# Patient Record
Sex: FEMALE | Race: OTHER | HISPANIC OR LATINO
[De-identification: names, ages, dates, MRNs, and addresses within clinical notes are randomized per-mention and may not be internally consistent; named-entity substitution may affect disease eponyms.]

---

## 2023-02-12 PROBLEM — Z00.00 ENCOUNTER FOR PREVENTIVE HEALTH EXAMINATION: Status: ACTIVE | Noted: 2023-02-12

## 2023-02-13 ENCOUNTER — APPOINTMENT (OUTPATIENT)
Dept: PSYCHIATRY | Facility: CLINIC | Age: 73
End: 2023-02-13
Payer: MEDICARE

## 2023-02-13 ENCOUNTER — TRANSCRIPTION ENCOUNTER (OUTPATIENT)
Age: 73
End: 2023-02-13

## 2023-02-13 PROCEDURE — 90792 PSYCH DIAG EVAL W/MED SRVCS: CPT | Mod: 95

## 2023-02-16 ENCOUNTER — APPOINTMENT (OUTPATIENT)
Dept: PSYCHIATRY | Facility: CLINIC | Age: 73
End: 2023-02-16
Payer: MEDICARE

## 2023-02-16 DIAGNOSIS — F41.1 GENERALIZED ANXIETY DISORDER: ICD-10-CM

## 2023-02-16 DIAGNOSIS — F60.9 PERSONALITY DISORDER, UNSPECIFIED: ICD-10-CM

## 2023-02-16 DIAGNOSIS — F32.A DEPRESSION, UNSPECIFIED: ICD-10-CM

## 2023-02-16 PROCEDURE — 99215 OFFICE O/P EST HI 40 MIN: CPT | Mod: 95

## 2023-02-16 NOTE — HISTORY OF PRESENT ILLNESS
[Suicidal Behavior/Ideation] : suicidal behavior/ideation [Not Applicable] : Not applicable [FreeTextEntry1] : Pt  was recommended for an evaluation by her daughter who works in healthcare (pediatric ICU ) and has observed changes in pt since her snf.\par \par  Pt reports having anxiety on and off her entire life. 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 \par \par Since snf she feels bored and that she has no meaning in her life. She also describes feeling greatly effected by socio-political issues that have created tension in her life and caused her to lose friends recently. Pt reports currently feeling afraid to leave the house due to concern that she could get sick. \par \par Pt described having a general fear and feeling her skin is crawling when she thinks about leaving the house. Pt was unable to provide specific examples of her anxiety.  Pt rates her anxiety 5/10 and despite her fear, she is able to leave the house and continues driving. \par \par Pt  reports that her anxiety and loneliness is at its worst when she is alone. When she goes out with others, such as her daughter, she reports feeling great. Pt did not endorse any changes in appetite or sleep disturbance. Pt endorsed past SI, related to a suicide attempt in 1994 and denied any SI since then. Pt denied any present or lifetime HI. \par  [FreeTextEntry2] : PAST OUTPATIENT TREATMENT (PSYCHOTHERAPY, PSYCHOPHARMACOLOGY, PSYCHOLOGICAL TESTING):\par Pt received therapy in 1994. Pt unsure if she worked with a therapist or just a psychiatrist. During this time she was prescribed various medications but does not remember what any of them were. \par \par PAST HOSPITALIZATIONS (DATES, REASON FOR ADMISSION, LENGTH OF STAY, TREATMENT, LAST PSYCHIATRIC TREATMENT):\par In 1994 pt was hospitalized for about a month following an attempt to end her life by overdosing on prescribed medications that she believes was xanax. Pt reports that during her hospitalization the medication she was given made her feel worse so she secretly stopped taking them and told the doctors what they wanted to hear to get discharged. \par \par \par  [FreeTextEntry3] : PAST PSYCHIATRIC MEDICATIONS (NAMES, DOSES, DATES TAKEN, EFFECTIVENESS):\par Pt reports being prescribed  medication around 1994 for anxiety and depression.Pt explained that  the more medication  she was put on, the worse she felt and eventually  became suicidal. She reports that’s that some of the medications lead to muscle spasms. Pt does not recall the various psychiatric medications she has previously been prescribed. Pt reports that she now focuses on holistic approaches and takes a lot of medication such as ashwagandha.\par \par
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
## 2023-02-16 NOTE — PSYCHOSOCIAL ASSESSMENT
[Yes (select details below)] : Have you ever experienced this type of event? Yes [HS Diploma or GED] : HS Diploma or GED [Not applicable] : not applicable [Retired] : retired [No source of income] : no source of income [Client lives alone] : client lives alone [Yes] : yes [Yes - Relationship: ____] : Yes - [unfilled] [No] : Patient has personal representation (legal guardian, representative payee, conservatorship)? No [FreeTextEntry1] : Started a college degree in Yi language at [employer] but did not finish [FreeTextEntry2] : strengths: Pt stated she is unsure of what her strengths are.  \par stressors:Pt described her current stressors as  life in general, the fact that everyone is fighting, the world  falling apart and thinking it is going to get worse and there being nothing she can do about it.\par supports: Pt reports she does not have social support right now although she is in a prayer group.  [had nightmares about the event(s) or thought about the event(s) when you did not want] : did not have nightmares and/or unwanted thoughts about the events [tried hard not to think about the event(s) or went out of your way to avoid situations that reminded you of the event] : did not need to avoid thinking about events, did not need to avoid situations that might remind patient of events [has been constantly on guard, watchful, or easily startled] : has not been constantly on guard, watchful, or easily startled [felt numb or detached from people, activities, or your surrounding] : has not felt numb or detached from people, activities, or surroundings [felt guilty or unable to stop blaming yourself or others for the event(s) or any problems the event(s) may have caused] : has not felt guilty or unable to stop blaming self or others for event(s), or any problems the event(s) may have caused [FreeTextEntry3] : Pt lives off Social security and her savings.\par  [FreeTextEntry4] : Pt lives alone in an apartment  on [facility]. She described her  living conditions as " not great".

## 2023-02-16 NOTE — REVIEW OF SYSTEMS
[Negative] : Allergic/Immunologic [de-identified] : depression, anxiety and some possible paranoid feelings

## 2023-02-16 NOTE — PHYSICAL EXAM
[Average] : average [Anxious] : anxious [Constricted] : constricted [Clear] : clear [Circumstantial] : circumstantial [WNL] : within normal limits [2 - Sometimes true (3-4 days)] : 2 - Sometimes true (3-4 days) [0 - Not at all true (0 days)] : 0 - Not at all true (0 days) [1 - Rarely true (1-2 days)] : 1 - Rarely true (1-2 days) [0 - Not at all] : 0 - Not at all [1 - Pretty good, most things are going well] : 1 - Pretty good, most things are going well [FreeTextEntry5] : irritable [FreeTextEntry1] : 5

## 2023-02-16 NOTE — PAST MEDICAL HISTORY
[FreeTextEntry1] : Pt reports issues with her eyes but that doctors cannot determine what it is. Pt has seen multiple doctors to address this.

## 2023-02-16 NOTE — PLAN
[Unable to complete assessment] : Unable to complete assessment [FreeTextEntry3] : Dr. Diez will be meeting with pt 2/16 to continue evaluation  [FreeTextEntry4] : Reviewed/discussed plan below:\par \par Meet for part two of extended evaluation to gain diagnostic clarity; may utilize screening tools such as CUDOS \par To get collateral from pt's daughter today\par Encourage pt to consider medication options as well as group and individual therapy options such as senior group\par To f/u with Dr Diez this Thursday at 12 pm\par

## 2023-02-16 NOTE — REVIEW OF SYSTEMS
[Negative] : Allergic/Immunologic [de-identified] : depression, anxiety and some signs of paranoid features
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
## 2023-02-16 NOTE — PSYCHOSOCIAL ASSESSMENT
[No] : Patient attended school, home tutoring, or received education instruction at anytime in the past three months? No [HS Diploma or GED] : HS Diploma or GED [FreeTextEntry1] : Started a college degree in Croatian language at [employer] but did not finish

## 2023-02-16 NOTE — DISCUSSION/SUMMARY
[FreeTextEntry1] : Pt is a 72 year old, heterosexual, cisgender, female currently living alone in an apartment in [facility]. Pt presented to intake evaluation seeking individual and group therapy. \par \par Pt remains somewhat reluctant to try medication due to past negative experiences with medications and mistrust of doctors though is open to the possiblity.  Has a history of anxiety and depression with increase in anxiety in recent years and fears of leaving her home due to COVID.  \par \par

## 2023-02-16 NOTE — PLAN
[Unable to complete assessment] : Unable to complete assessment [FreeTextEntry4] : still in extended eval period [FreeTextEntry5] : \par Reviewed/discussed plan below:\par \par Patient to consider potential medications for anxiety and mood control including:  Sertraline, Lexapro and Gabapentin which were all reviewed today with pt with review of R/B/SE of each\par Patient interested in group therapy and writer attempting to find group for patient (calls, emails and task sent out for this today)\par Writer to contact patient in coming days with update about possibility to start group and reconvene with writer regarding medications\par Pt to continue on extended evaluation until determination made as to whether patient will be \par  [FreeTextEntry3] : Dr. Diez will be meeting with pt 2/16 to continue evaluation

## 2023-02-16 NOTE — FAMILY HISTORY
[FreeTextEntry1] : PSYCHIATRIC ILLNESS: When pt was 18 she moved out of her family home. In response, pt's mother attempted suicide and was hospitalized. Pt's mother had a history of depression\par SUICIDE:\par Pt's mother had one suicide attempt.\par SUBSTANCE ABUSE:\par none reported \par \par \par

## 2023-02-16 NOTE — REASON FOR VISIT
[Verbal consent obtained from patient/other participant(s)] : Verbal consent for telehealth/telephonic services obtained from patient/other participant(s) [Number can be texted] : number can be texted [OK  to leave message] : OK  to leave message [Patient] : Patient [Other:___] : due to [unfilled] [Telehealth (audio & video) - Individual/Group] : This visit was provided via telehealth using real-time 2-way audio visual technology. [Other Location: e.g. Home (Enter Location, City,State)___] : The patient, [unfilled], was located at [unfilled] at the time of the visit. [FreeTextEntry4] : 9:00am  [FreeTextEntry3] : [email or web address] [FreeTextEntry5] : English  [FreeTextEntry6] : Nakia [FreeTextEntry7] : she/her [FreeTextEntry2] : extreme anxiety  [FreeTextEntry1] : individual therapy and group therapy - possibly open to medications but with much wariness. \par Pt is seeking individual and group therapy to manage symptoms of anxiety. She is considering medications but is very hesitant and mistrustful of doctors\par  normal/clear to auscultation bilaterally/no wheezes/no rales/no rhonchi/airway patent/breath sounds equal/respirations non-labored

## 2023-02-16 NOTE — HISTORY OF PRESENT ILLNESS
[Suicidal Behavior/Ideation] : suicidal behavior/ideation [Not Applicable] : Not applicable [FreeTextEntry1] : Pt  was recommended for an evaluation by her daughter who works in healthcare (pediatric ICU ) and has observed changes in pt since her USP.\par \par  Pt reports having anxiety on and off her entire life. 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 \par \par Since USP she feels bored and that she has no meaning in her life. She also describes feeling greatly effected by socio-political issues that have created tension in her life and caused her to lose friends recently. Pt reports currently feeling afraid to leave the house due to concern that she could get sick. \par \par Pt described having a general fear and feeling her skin is crawling when she thinks about leaving the house. Pt was unable to provide specific examples of her anxiety.  Pt rates her anxiety 5/10 and despite her fear, she is able to leave the house and continues driving. \par \par Pt  reports that her anxiety and loneliness is at its worst when she is alone. When she goes out with others, such as her daughter, she reports feeling great. Pt did not endorse any changes in appetite or sleep disturbance. Pt endorsed past SI, related to a suicide attempt in 1994 and denied any SI since then. Pt denied any present or lifetime HI. \par  [FreeTextEntry2] : PAST OUTPATIENT TREATMENT (PSYCHOTHERAPY, PSYCHOPHARMACOLOGY, PSYCHOLOGICAL TESTING):\par Pt received therapy in 1994. Pt unsure if she worked with a therapist or just a psychiatrist. During this time she was prescribed various medications but does not remember what any of them were. \par \par PAST HOSPITALIZATIONS (DATES, REASON FOR ADMISSION, LENGTH OF STAY, TREATMENT, LAST PSYCHIATRIC TREATMENT):\par In 1994 pt was hospitalized for about a month following an attempt to end her life by overdosing on prescribed medications that she believes was xanax. Pt reports that during her hospitalization the medication she was given made her feel worse so she secretly stopped taking them and told the doctors what they wanted to hear to get discharged. \par \par \par  [FreeTextEntry3] : PAST PSYCHIATRIC MEDICATIONS (NAMES, DOSES, DATES TAKEN, EFFECTIVENESS):\par Pt reports being prescribed  medication around 1994 for anxiety and depression.Pt explained that  the more medication  she was put on, the worse she felt and eventually  became suicidal. She reports that’s that some of the medications lead to muscle spasms. Pt does not recall the various psychiatric medications she has previously been prescribed. Pt reports that she now focuses on holistic approaches and takes a lot of medication such as ashwagandha.\par \par
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
## 2023-02-16 NOTE — PHYSICAL EXAM
[Average] : average [Anxious] : anxious [Clear] : clear [Circumstantial] : circumstantial [WNL] : within normal limits [Constricted] : constricted [FreeTextEntry5] : irritable

## 2023-02-16 NOTE — RISK ASSESSMENT
[No, patient denies ideation or behavior] : No, patient denies ideation or behavior [Yes, more than three months ago] : Yes, more than three months ago [Low acute suicide risk] : Low acute suicide risk [No] : No [Not clinically indicated] : Safety Plan completed/updated (for individuals at risk): Not clinically indicated

## 2023-02-16 NOTE — REASON FOR VISIT
[Other:___] : due to [unfilled] [Telehealth (audio & video) - Individual/Group] : This visit was provided via telehealth using real-time 2-way audio visual technology. [Verbal consent obtained from patient/other participant(s)] : Verbal consent for telehealth/telephonic services obtained from patient/other participant(s) [Other Location: e.g. Home (Enter Location, City,State)___] : The provider was located at [unfilled]. [Home] : The patient, [unfilled], was located at home, [unfilled], at the time of the visit.

## 2023-02-16 NOTE — BEHAVIORAL HEALTH
[Prevent psychological harm to patient or another individual] : Prevent psychological harm to patient or another individual [FreeTextEntry2] : Following the initial intake assessment  spoke to pt's daughter to gather collateral. Pt's daughter shared that her mother has always been anxious and depressed  and that it is not related to Covid-19 or other external circumstances. Pt's daughter revealed that pt struggles with hoarding, has OCD like tendencies regarding material things, and is very disorganized. While pt reported she has only had one suicide attempt, pts daughter shared that there was 2 attempts around the same time in 1994- both using antianxiety pills to try and overdose.

## 2023-03-14 ENCOUNTER — NON-APPOINTMENT (OUTPATIENT)
Age: 73
End: 2023-03-14

## 2024-01-22 NOTE — DISCUSSION/SUMMARY
Routed to Dr. Landin for approval, no protocol at this time    Last office visit was 11/7/23    Last CMP was 8/28/23    BP at last visit was 114/68     [Low acute suicide risk] : Low acute suicide risk [No] : No [Not clinically indicated] : Safety Plan completed/updated (for individuals at risk): Not clinically indicated [FreeTextEntry1] : Pt denied any SI within the past 25 years. For this reason she should be considered low risk.